# Patient Record
Sex: FEMALE | Race: WHITE | NOT HISPANIC OR LATINO | Employment: FULL TIME | URBAN - METROPOLITAN AREA
[De-identification: names, ages, dates, MRNs, and addresses within clinical notes are randomized per-mention and may not be internally consistent; named-entity substitution may affect disease eponyms.]

---

## 2018-10-17 ENCOUNTER — HISTORICAL (OUTPATIENT)
Dept: INTENSIVE CARE | Facility: HOSPITAL | Age: 43
End: 2018-10-17

## 2022-04-10 ENCOUNTER — HISTORICAL (OUTPATIENT)
Dept: ADMINISTRATIVE | Facility: HOSPITAL | Age: 47
End: 2022-04-10
Payer: COMMERCIAL

## 2022-04-27 VITALS
DIASTOLIC BLOOD PRESSURE: 68 MMHG | WEIGHT: 108 LBS | BODY MASS INDEX: 19.14 KG/M2 | SYSTOLIC BLOOD PRESSURE: 96 MMHG | HEIGHT: 63 IN

## 2022-06-01 DIAGNOSIS — G43.909 MIGRAINE WITHOUT STATUS MIGRAINOSUS, NOT INTRACTABLE, UNSPECIFIED MIGRAINE TYPE: Primary | ICD-10-CM

## 2022-06-01 RX ORDER — ERENUMAB-AOOE 140 MG/ML
140 INJECTION, SOLUTION SUBCUTANEOUS
Qty: 1 ML | Refills: 11 | Status: SHIPPED | OUTPATIENT
Start: 2022-06-01 | End: 2023-04-24 | Stop reason: SDUPTHER

## 2022-06-01 RX ORDER — ERENUMAB-AOOE 140 MG/ML
140 INJECTION, SOLUTION SUBCUTANEOUS
Refills: 11 | COMMUNITY
Start: 2022-04-26 | End: 2022-06-01 | Stop reason: SDUPTHER

## 2022-06-22 RX ORDER — METHYLPHENIDATE HYDROCHLORIDE 36 MG/1
36 TABLET ORAL EVERY MORNING
COMMUNITY
Start: 2022-04-26 | End: 2023-07-18

## 2022-06-22 RX ORDER — PROPRANOLOL HYDROCHLORIDE 20 MG/1
40 TABLET ORAL 2 TIMES DAILY
COMMUNITY
Start: 2022-03-25

## 2022-06-22 RX ORDER — METHYLPHENIDATE HYDROCHLORIDE 54 MG/1
54 TABLET ORAL DAILY
COMMUNITY
Start: 2021-12-09 | End: 2022-06-23

## 2022-06-22 RX ORDER — ERENUMAB-AOOE 140 MG/ML
140 INJECTION, SOLUTION SUBCUTANEOUS
COMMUNITY
Start: 2021-05-18 | End: 2022-06-23

## 2022-06-22 RX ORDER — TRAZODONE HYDROCHLORIDE 50 MG/1
100 TABLET ORAL NIGHTLY PRN
COMMUNITY
Start: 2022-01-21

## 2022-06-22 RX ORDER — LEVOTHYROXINE SODIUM 50 UG/1
100 TABLET ORAL DAILY
COMMUNITY
Start: 2021-12-09 | End: 2023-01-31

## 2022-06-22 RX ORDER — BUPROPION HYDROCHLORIDE 300 MG/1
300 TABLET ORAL DAILY
COMMUNITY
Start: 2022-02-23 | End: 2022-06-23

## 2022-06-22 RX ORDER — CLONIDINE HYDROCHLORIDE 0.1 MG/1
0.1 TABLET ORAL 2 TIMES DAILY
COMMUNITY
Start: 2022-05-26

## 2022-06-22 RX ORDER — MONTELUKAST SODIUM 4 MG/1
2 TABLET, CHEWABLE ORAL 2 TIMES DAILY
COMMUNITY
Start: 2021-12-09 | End: 2022-06-23

## 2022-06-22 RX ORDER — PRASTERONE 6.5 MG/1
1 INSERT VAGINAL
COMMUNITY
Start: 2022-03-22

## 2022-06-22 RX ORDER — OXYCODONE AND ACETAMINOPHEN 10; 325 MG/1; MG/1
1 TABLET ORAL 4 TIMES DAILY PRN
COMMUNITY
Start: 2022-05-26

## 2022-06-22 RX ORDER — LEVETIRACETAM 500 MG/1
2500 TABLET, EXTENDED RELEASE ORAL DAILY
COMMUNITY
Start: 2021-07-09 | End: 2022-06-28

## 2022-06-22 RX ORDER — DOXYCYCLINE HYCLATE 100 MG
100 TABLET ORAL 2 TIMES DAILY
COMMUNITY
Start: 2022-01-07 | End: 2022-06-23

## 2022-06-22 RX ORDER — BUPROPION HYDROCHLORIDE 150 MG/1
150 TABLET ORAL EVERY MORNING
COMMUNITY
Start: 2022-05-27 | End: 2023-07-18

## 2022-06-22 RX ORDER — BUPROPION HYDROCHLORIDE 300 MG/1
300 TABLET ORAL DAILY
COMMUNITY
Start: 2021-12-09 | End: 2022-06-23

## 2022-06-23 ENCOUNTER — OFFICE VISIT (OUTPATIENT)
Dept: NEUROLOGY | Facility: CLINIC | Age: 47
End: 2022-06-23
Payer: COMMERCIAL

## 2022-06-23 VITALS
BODY MASS INDEX: 17.79 KG/M2 | SYSTOLIC BLOOD PRESSURE: 94 MMHG | HEIGHT: 63 IN | WEIGHT: 100.38 LBS | HEART RATE: 68 BPM | DIASTOLIC BLOOD PRESSURE: 70 MMHG

## 2022-06-23 DIAGNOSIS — G40.209 PARTIAL SYMPTOMATIC EPILEPSY WITH COMPLEX PARTIAL SEIZURES, NOT INTRACTABLE, WITHOUT STATUS EPILEPTICUS: ICD-10-CM

## 2022-06-23 DIAGNOSIS — G43.909 MIGRAINE WITHOUT STATUS MIGRAINOSUS, NOT INTRACTABLE, UNSPECIFIED MIGRAINE TYPE: Primary | ICD-10-CM

## 2022-06-23 PROBLEM — R56.9 SEIZURES: Status: ACTIVE | Noted: 2022-06-23

## 2022-06-23 PROBLEM — G40.909 NON-REFRACTORY EPILEPSY: Status: ACTIVE | Noted: 2022-06-23

## 2022-06-23 PROCEDURE — 99214 PR OFFICE/OUTPT VISIT, EST, LEVL IV, 30-39 MIN: ICD-10-PCS | Mod: S$GLB,,, | Performed by: NURSE PRACTITIONER

## 2022-06-23 PROCEDURE — 1160F RVW MEDS BY RX/DR IN RCRD: CPT | Mod: CPTII,S$GLB,, | Performed by: NURSE PRACTITIONER

## 2022-06-23 PROCEDURE — 3078F DIAST BP <80 MM HG: CPT | Mod: CPTII,S$GLB,, | Performed by: NURSE PRACTITIONER

## 2022-06-23 PROCEDURE — 3008F PR BODY MASS INDEX (BMI) DOCUMENTED: ICD-10-PCS | Mod: CPTII,S$GLB,, | Performed by: NURSE PRACTITIONER

## 2022-06-23 PROCEDURE — 1159F MED LIST DOCD IN RCRD: CPT | Mod: CPTII,S$GLB,, | Performed by: NURSE PRACTITIONER

## 2022-06-23 PROCEDURE — 99999 PR PBB SHADOW E&M-EST. PATIENT-LVL III: ICD-10-PCS | Mod: PBBFAC,,, | Performed by: NURSE PRACTITIONER

## 2022-06-23 PROCEDURE — 3078F PR MOST RECENT DIASTOLIC BLOOD PRESSURE < 80 MM HG: ICD-10-PCS | Mod: CPTII,S$GLB,, | Performed by: NURSE PRACTITIONER

## 2022-06-23 PROCEDURE — 99214 OFFICE O/P EST MOD 30 MIN: CPT | Mod: S$GLB,,, | Performed by: NURSE PRACTITIONER

## 2022-06-23 PROCEDURE — 1159F PR MEDICATION LIST DOCUMENTED IN MEDICAL RECORD: ICD-10-PCS | Mod: CPTII,S$GLB,, | Performed by: NURSE PRACTITIONER

## 2022-06-23 PROCEDURE — 1160F PR REVIEW ALL MEDS BY PRESCRIBER/CLIN PHARMACIST DOCUMENTED: ICD-10-PCS | Mod: CPTII,S$GLB,, | Performed by: NURSE PRACTITIONER

## 2022-06-23 PROCEDURE — 3008F BODY MASS INDEX DOCD: CPT | Mod: CPTII,S$GLB,, | Performed by: NURSE PRACTITIONER

## 2022-06-23 PROCEDURE — 99999 PR PBB SHADOW E&M-EST. PATIENT-LVL III: CPT | Mod: PBBFAC,,, | Performed by: NURSE PRACTITIONER

## 2022-06-23 PROCEDURE — 3074F SYST BP LT 130 MM HG: CPT | Mod: CPTII,S$GLB,, | Performed by: NURSE PRACTITIONER

## 2022-06-23 PROCEDURE — 3074F PR MOST RECENT SYSTOLIC BLOOD PRESSURE < 130 MM HG: ICD-10-PCS | Mod: CPTII,S$GLB,, | Performed by: NURSE PRACTITIONER

## 2022-06-23 RX ORDER — LASMIDITAN 50 MG/1
50 TABLET ORAL ONCE AS NEEDED
Qty: 8 TABLET | Refills: 0 | Status: SHIPPED | OUTPATIENT
Start: 2022-06-23 | End: 2022-06-23

## 2022-06-23 RX ORDER — CALCITRIOL 0.25 UG/1
0.25 CAPSULE ORAL DAILY
COMMUNITY
Start: 2022-06-03

## 2022-06-23 NOTE — PROGRESS NOTES
Subjective:       Patient ID: Karyn Clemente is a 47 y.o. female.    Chief Complaint: Seizures (Denies seizures since last visit) and Migraine (Increase of migraines is still having one migraine will last a few days most recently lasted three days rescue medication was unable to break migraine )      History of Present Illness:  Follow-up visit for seizures and migraine.     Patient reports migraines have overall been well controlled.  She is on Aimovig and propanolol.  She says she is still getting anywhere from 1-3 migraines per month, but they are much less severe and typically not lasting very long.  She does note that she had a very severe migraine this month where she was basically in bed for 3 days.  She tried rescue with Imitrex as well as rescue with the migraine cocktail, but did not have any relief.  In the past, she was taking Nurtec for rescue and it was helpful for her according to her notes.  She is not sure why she no longer has that medication.    Patient tells me she has not had any overt seizure activity since last visit.  She says she has recently been noticing that her kids will ask her if she is paying attention, but she does not have any lapses in time like she had before.  She does note that she is currently under a lot of stress.  She is now back on Wellbutrin and Concerta.  She says these were both restarted within the last year.  After restarting the meds, as she has been suffering with unintentional weight loss.  Her endocrinologist has tried altering or thyroid medication, but she has still been symptomatic.  She tells me that they are considering looking into her pituitary gland as a possible contributing factor.  Dr. Sultana halved her Wellbutrin and Concerta doses about 4 months ago in an effort to help with the weight loss.  She tells me she has not noticed any weight benefit since that time.  She says the Wellbutrin really isn't controlling her depression very well, but she does  "note significant improvement with energy and concentration on Concerta.      Review of Systems  See HPI above        Outpatient Encounter Medications as of 6/23/2022   Medication Sig Dispense Refill    AIMOVIG AUTOINJECTOR 140 mg/mL autoinjector Inject 1 mL (140 mg total) into the skin every 30 days. I mL (140 mg), qMonth 1 mL 11    buPROPion (WELLBUTRIN XL) 150 MG TB24 tablet Take 150 mg by mouth every morning.      calcitRIOL (ROCALTROL) 0.25 MCG Cap Take 0.25 mcg by mouth once daily.      cloNIDine (CATAPRES) 0.1 MG tablet Take 0.1 mg by mouth 2 (two) times daily.      INTRAROSA 6.5 mg Inst Place 1 application vaginally as needed.      levetiracetam XR (KEPPRA XR) 500 mg Tb24 24 hr tablet Take 2,500 mg by mouth Daily.      levothyroxine (SYNTHROID) 50 MCG tablet Take 100 mcg by mouth Daily.      methylphenidate HCl 36 MG CR tablet Take 36 mg by mouth every morning.      oxyCODONE-acetaminophen (PERCOCET)  mg per tablet Take 1 tablet by mouth 4 (four) times daily as needed.      propranoloL (INDERAL) 20 MG tablet Take 40 mg by mouth 2 (two) times daily.      traZODone (DESYREL) 50 MG tablet Take 100 mg by mouth nightly as needed.      [DISCONTINUED] buPROPion (WELLBUTRIN XL) 300 MG 24 hr tablet Take 300 mg by mouth once daily.      [DISCONTINUED] buPROPion (WELLBUTRIN XL) 300 MG 24 hr tablet Take 300 mg by mouth Daily.      [DISCONTINUED] colestipoL (COLESTID) 1 gram Tab Take 2 g by mouth 2 (two) times a day.      [DISCONTINUED] doxycycline (VIBRA-TABS) 100 MG tablet Take 100 mg by mouth 2 (two) times daily.      [DISCONTINUED] erenumab-aooe (AIMOVIG AUTOINJECTOR) 140 mg/mL autoinjector Inject 140 mg into the skin every 30 days.      [DISCONTINUED] methylphenidate HCl 54 MG CR tablet Take 54 mg by mouth Daily.       No facility-administered encounter medications on file as of 6/23/2022.      Objective:   BP 94/70   Pulse 68   Ht 5' 2.99" (1.6 m)   Wt 45.5 kg (100 lb 6.4 oz)   BMI 17.79 " kg/m²          Physical Exam  Vitals and nursing note reviewed.   Constitutional:       Appearance: Normal appearance. She is normal weight.   HENT:      Head: Normocephalic.   Pulmonary:      Effort: Pulmonary effort is normal.   Neurological:      General: No focal deficit present.      Mental Status: She is alert and oriented to person, place, and time.      Cranial Nerves: No dysarthria or facial asymmetry.      Motor: Motor function is intact.      Gait: Gait is intact.   Psychiatric:         Mood and Affect: Mood normal.           Assessment & Plan:      1. Migraine without status migrainosus, not intractable, unspecified migraine type  Overview:  She has been on several AEDs for seizures.  She has also been on Lyrica, cymbalta, propranolol, trazadone, and topiramate in the past.  She was started on Aimovig in 2018 and migraines have been well controlled ever since.     Prior abortive include PO and SQ Imitrex, cambia, migraine cocktail, sprix    Assessment & Plan:  Overall, doing well with aimovig and propranolol as preventives.  Just needs better rescue for more severe migraines.  Resume nurtec prn.  Instructed to take nurtec 1st for rescue.  She can take imitrex or migraine cocktail if headache not relieved by nurtec.  If still no relief, can try Reyvow 4 hours later.  Instructed to not drive for at least 8 hours after Reyvow dosing.      2. Partial symptomatic epilepsy with complex partial seizures, not intractable, without status epilepticus  Overview:  This is a patient who was established with Dr. Monahan/Shagufta in 2016.  Her care was transferred to Dr. Taylor in October 2017. Her typical seizures historically consisted of a headache or sharp pain on the left side, photophobia, and phonophobia.  She could hear things but cannot respond and she would have lapses in time.  In the past, she has been on Zonegran, topiramate (memory issues), IR Keppra, XR Keppra.  She is now maintained on keppra XR 2500 mg  daily    Assessment & Plan:  -Continue keppra XR 2500 mg daily  -We did discuss the risk of Wellbutrin in seizures.  Her dose was decreased due to weight loss and she is not really seeing much benefit with the current dose so I am wondering if maybe she would see more benefit with something like Effexor which would not pose the same risk.  She will discuss this with Dr. Sultana at her next visit in August.          This note was created with the assistance of voice recognition software. There may be transcription errors as a result of using this technology however minimal. Effort has been made to assure accuracy of transcription but any obvious errors or omissions should be clarified with the author of the document.

## 2022-06-23 NOTE — ASSESSMENT & PLAN NOTE
-Continue keppra XR 2500 mg daily  -We did discuss the risk of Wellbutrin in seizures.  Her dose was decreased due to weight loss and she is not really seeing much benefit with the current dose so I am wondering if maybe she would see more benefit with something like Effexor which would not pose the same risk.  She will discuss this with Dr. Sultana at her next visit in August.

## 2022-06-23 NOTE — ASSESSMENT & PLAN NOTE
Overall, doing well with aimovig and propranolol as preventives.  Just needs better rescue for more severe migraines.  Resume nurtec prn.  Instructed to take nurtec 1st for rescue.  She can take imitrex or migraine cocktail if headache not relieved by nurtec.  If still no relief, can try Reyvow 4 hours later.  Instructed to not drive for at least 8 hours after Reyvow dosing.

## 2023-01-31 ENCOUNTER — OFFICE VISIT (OUTPATIENT)
Dept: NEUROLOGY | Facility: CLINIC | Age: 48
End: 2023-01-31
Payer: COMMERCIAL

## 2023-01-31 VITALS
SYSTOLIC BLOOD PRESSURE: 106 MMHG | HEART RATE: 60 BPM | BODY MASS INDEX: 18.78 KG/M2 | HEIGHT: 63 IN | DIASTOLIC BLOOD PRESSURE: 72 MMHG | WEIGHT: 106 LBS

## 2023-01-31 DIAGNOSIS — R47.89 WORD FINDING DIFFICULTY: Primary | ICD-10-CM

## 2023-01-31 DIAGNOSIS — G43.709 CHRONIC MIGRAINE W/O AURA W/O STATUS MIGRAINOSUS, NOT INTRACTABLE: ICD-10-CM

## 2023-01-31 DIAGNOSIS — M54.9 DORSALGIA, UNSPECIFIED: ICD-10-CM

## 2023-01-31 DIAGNOSIS — M48.02 SPINAL STENOSIS, CERVICAL REGION: ICD-10-CM

## 2023-01-31 PROCEDURE — 99215 PR OFFICE/OUTPT VISIT, EST, LEVL V, 40-54 MIN: ICD-10-PCS | Mod: S$GLB,,, | Performed by: PSYCHIATRY & NEUROLOGY

## 2023-01-31 PROCEDURE — 1159F MED LIST DOCD IN RCRD: CPT | Mod: CPTII,S$GLB,, | Performed by: PSYCHIATRY & NEUROLOGY

## 2023-01-31 PROCEDURE — 3074F SYST BP LT 130 MM HG: CPT | Mod: CPTII,S$GLB,, | Performed by: PSYCHIATRY & NEUROLOGY

## 2023-01-31 PROCEDURE — 3008F PR BODY MASS INDEX (BMI) DOCUMENTED: ICD-10-PCS | Mod: CPTII,S$GLB,, | Performed by: PSYCHIATRY & NEUROLOGY

## 2023-01-31 PROCEDURE — 3074F PR MOST RECENT SYSTOLIC BLOOD PRESSURE < 130 MM HG: ICD-10-PCS | Mod: CPTII,S$GLB,, | Performed by: PSYCHIATRY & NEUROLOGY

## 2023-01-31 PROCEDURE — 1159F PR MEDICATION LIST DOCUMENTED IN MEDICAL RECORD: ICD-10-PCS | Mod: CPTII,S$GLB,, | Performed by: PSYCHIATRY & NEUROLOGY

## 2023-01-31 PROCEDURE — 1160F PR REVIEW ALL MEDS BY PRESCRIBER/CLIN PHARMACIST DOCUMENTED: ICD-10-PCS | Mod: CPTII,S$GLB,, | Performed by: PSYCHIATRY & NEUROLOGY

## 2023-01-31 PROCEDURE — 3078F PR MOST RECENT DIASTOLIC BLOOD PRESSURE < 80 MM HG: ICD-10-PCS | Mod: CPTII,S$GLB,, | Performed by: PSYCHIATRY & NEUROLOGY

## 2023-01-31 PROCEDURE — 3078F DIAST BP <80 MM HG: CPT | Mod: CPTII,S$GLB,, | Performed by: PSYCHIATRY & NEUROLOGY

## 2023-01-31 PROCEDURE — 99215 OFFICE O/P EST HI 40 MIN: CPT | Mod: S$GLB,,, | Performed by: PSYCHIATRY & NEUROLOGY

## 2023-01-31 PROCEDURE — 1160F RVW MEDS BY RX/DR IN RCRD: CPT | Mod: CPTII,S$GLB,, | Performed by: PSYCHIATRY & NEUROLOGY

## 2023-01-31 PROCEDURE — 99999 PR PBB SHADOW E&M-EST. PATIENT-LVL IV: ICD-10-PCS | Mod: PBBFAC,,, | Performed by: PSYCHIATRY & NEUROLOGY

## 2023-01-31 PROCEDURE — 3008F BODY MASS INDEX DOCD: CPT | Mod: CPTII,S$GLB,, | Performed by: PSYCHIATRY & NEUROLOGY

## 2023-01-31 PROCEDURE — 99999 PR PBB SHADOW E&M-EST. PATIENT-LVL IV: CPT | Mod: PBBFAC,,, | Performed by: PSYCHIATRY & NEUROLOGY

## 2023-01-31 RX ORDER — LEVOTHYROXINE SODIUM 100 UG/1
100 TABLET ORAL DAILY
COMMUNITY
Start: 2023-01-20

## 2023-01-31 NOTE — PROGRESS NOTES
Chief Complaint   Patient presents with    Migraine     Pt states migraine intensity has decreased since having 1-2 a week lasting up to two days currently Aimovig along with propranolol; sumatriptan and rayvow for rescue unable to get approval for nurtec     Seizures     Denies seizure activity since last visit currently Keppra 2500 mg daily        This is a 47 y.o. female here for follow up for seizures and migraine.  Patient reports migraines have overall been well controlled.  She is on Aimovig and propranolol.  She does not like the way sumatriptan makes her feel it makes her feel tingly and with pins and needles.  She does at sometimes have to take rare bowel for more severe headaches.  She is currently getting anywhere from 1-3 migraines per month but they are less severe.  She continues to have intermittent severe migraines which put her in bed for a few days.  For these she has tried migraine cocktail which sometimes worse but sometimes she does not have relief.  She is unable to get Nurtec due to insurance reasons.  She denies any seizures.  Is taking Keppra 2500 mg daily.  Recall this was increased after patient reported having spells of zoning out prior to the December 2021 visit.  Recall she is a previous patient of Dr. Bernstein.  Previously he had done an EMG on her due to right-sided symptoms and was found to have a right L5 radiculopathy.  She has had previous MRIs with Dr. Yuriy Amaya.  She continues to complain of weakness now in her left hand as well.  Feels like she is losing function in the hand.  Has also noticed some tingling in that hand.  Continues to have right leg weakness.  Also is complaining of word-finding difficulty.  This has been going on for some time, she thinks prior to any med changes (recall her Ritalin as well as her Wellbutrin was decreased due to weight loss).  Makes it difficult for her to have a conversation.    Medication List with Changes/Refills   New Medications     UBROGEPANT (UBRELVY) 100 MG TABLET    Take 1 tablet (100 mg total) by mouth once as needed for Migraine.   Current Medications    AIMOVIG AUTOINJECTOR 140 MG/ML AUTOINJECTOR    Inject 1 mL (140 mg total) into the skin every 30 days. I mL (140 mg), qMonth    BUPROPION (WELLBUTRIN XL) 150 MG TB24 TABLET    Take 150 mg by mouth every morning.    CALCITRIOL (ROCALTROL) 0.25 MCG CAP    Take 0.25 mcg by mouth once daily.    CLONIDINE (CATAPRES) 0.1 MG TABLET    Take 0.1 mg by mouth 2 (two) times daily.    INTRAROSA 6.5 MG INST    Place 1 application vaginally as needed.    LEVETIRACETAM XR (KEPPRA XR) 500 MG TB24 24 HR TABLET    TAKE FIVE TABLETS BY MOUTH EVERY DAY    LEVOTHYROXINE (SYNTHROID) 100 MCG TABLET    Take 100 mcg by mouth once daily.    METHYLPHENIDATE HCL 36 MG CR TABLET    Take 36 mg by mouth every morning.    OXYCODONE-ACETAMINOPHEN (PERCOCET)  MG PER TABLET    Take 1 tablet by mouth 4 (four) times daily as needed.    PROPRANOLOL (INDERAL) 20 MG TABLET    Take 40 mg by mouth 2 (two) times daily.    SUMATRIPTAN (IMITREX) 50 MG TABLET    TAKE 1 TABLET ONCE DAILY AS NEEDED FOR MIGRAINE--MAY REPEAT DOSE ONCE IN 2 HOURS    TRAZODONE (DESYREL) 50 MG TABLET    Take 100 mg by mouth nightly as needed.   Discontinued Medications    LEVOTHYROXINE (SYNTHROID) 50 MCG TABLET    Take 100 mcg by mouth Daily.        Vitals:    01/31/23 1059   BP: 106/72   Pulse: 60        General: alert and oriented, no acute distress, no audible wheezes, pulse intact, no edema    Cognition and Comprehension  Speech and language intact  Follows commands  Speech fluent  Attention intact  Memory for recent events intact from history taking  Affect pleasant  Fund of knowledge adequate    Cranial nerves  II. Optic: Visual fields full to confrontation both eyes  III, IV, VI. Oculomotor: Intact, Pupils equal, round and reactive to light, no nystagmus  V. Trigeminal: sensation to light touch normal  VII. No facial asymmetry or facial  weakness  VIII. Hearing intact to spoken voice  IX/X. Glossopharyngeal/Vagus: Voice normal, palate rises symmetrically  XI. Axillary: Shoulder shrug normal  XII. Hypoglossal: Intact    Muscle Strength and Tone  Normal upper extremity tone  Normal lower extremity tone  Normal upper extremity strength  Normal lower extremity strength  Give way weakness throughout    Sensation  Intact to light touch and temperature    Reflexes  Normal and symmetric    Coordination and Gait  Finger to nose normal  Gait normal     1. Word finding difficulty  -     Vitamin B12; Future; Expected date: 01/31/2023  -     MRI Brain Without Contrast; Future; Expected date: 01/31/2023    2. Dorsalgia, unspecified  -     MRI Lumbar Spine Without Contrast; Future; Expected date: 01/31/2023    3. Spinal stenosis, cervical region  -     MRI Cervical Spine Without Contrast; Future; Expected date: 01/31/2023    4. Chronic migraine w/o aura w/o status migrainosus, not intractable  -     ubrogepant (UBRELVY) 100 mg tablet; Take 1 tablet (100 mg total) by mouth once as needed for Migraine.  Dispense: 16 tablet; Refill: 5

## 2023-02-06 ENCOUNTER — TELEPHONE (OUTPATIENT)
Dept: NEUROLOGY | Facility: CLINIC | Age: 48
End: 2023-02-06
Payer: COMMERCIAL

## 2023-02-06 NOTE — TELEPHONE ENCOUNTER
Patient called requesting a status update on her disability paperwork that she left at the office. States the paperwork needs to be submitted by Friday, February 10, 2023. Requesting a call back to let her know when it is completed and she will come pick it up. Please advise.

## 2023-03-07 ENCOUNTER — TELEPHONE (OUTPATIENT)
Dept: NEUROLOGY | Facility: CLINIC | Age: 48
End: 2023-03-07

## 2023-03-07 NOTE — TELEPHONE ENCOUNTER
Pt informed of MRIs are overall unremarkable. 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 If pain is present within the neck and back therapy or injections would be an option. MRI brain shows findings seen with headaches and normal for her age.

## 2023-03-07 NOTE — TELEPHONE ENCOUNTER
----- Message from Grazyna Harding MD sent at 3/6/2023  4:34 PM CST -----  Please let patient know MRIs are overall unremarkable. She does have arthritis in neck and back but nothing that would warrant surgery or surgical consults. If having pain in neck or back, therapy or injections could be pursued. MRI brain shows findings we would see with headaches and normal for her age.

## 2023-04-24 ENCOUNTER — TELEPHONE (OUTPATIENT)
Dept: NEUROLOGY | Facility: CLINIC | Age: 48
End: 2023-04-24
Payer: COMMERCIAL

## 2023-04-24 DIAGNOSIS — G43.909 MIGRAINE WITHOUT STATUS MIGRAINOSUS, NOT INTRACTABLE, UNSPECIFIED MIGRAINE TYPE: ICD-10-CM

## 2023-04-24 RX ORDER — ERENUMAB-AOOE 140 MG/ML
140 INJECTION, SOLUTION SUBCUTANEOUS
Qty: 1 ML | Refills: 11 | Status: SHIPPED | OUTPATIENT
Start: 2023-04-24 | End: 2023-06-19

## 2023-04-24 NOTE — TELEPHONE ENCOUNTER
Reports [facility] pharmacy notified her this medication is not being covered by her insurance, but she has received  a message from the clinic notifying her this has been approved.   States seeking advice.    Medication: Aimovig 140 mg/ mL      Last Appointment: 1/31/23    Next Appointment: 7/18/23

## 2023-04-25 NOTE — TELEPHONE ENCOUNTER
Spoke with pharmacy informed of current approval instructed to correct day supply for 28 days also faxed PA approval to pharmacy informed Pt as well and to call office if any difficulties receiving medication

## 2023-06-16 DIAGNOSIS — G43.909 MIGRAINE WITHOUT STATUS MIGRAINOSUS, NOT INTRACTABLE, UNSPECIFIED MIGRAINE TYPE: ICD-10-CM

## 2023-06-19 RX ORDER — ERENUMAB-AOOE 140 MG/ML
INJECTION, SOLUTION SUBCUTANEOUS
Qty: 1 ML | Refills: 11 | Status: SHIPPED | OUTPATIENT
Start: 2023-06-19 | End: 2023-07-18 | Stop reason: SDUPTHER

## 2023-07-18 ENCOUNTER — OFFICE VISIT (OUTPATIENT)
Dept: NEUROLOGY | Facility: CLINIC | Age: 48
End: 2023-07-18
Payer: COMMERCIAL

## 2023-07-18 VITALS
BODY MASS INDEX: 24.99 KG/M2 | DIASTOLIC BLOOD PRESSURE: 72 MMHG | SYSTOLIC BLOOD PRESSURE: 110 MMHG | HEART RATE: 74 BPM | WEIGHT: 108 LBS | HEIGHT: 55 IN

## 2023-07-18 DIAGNOSIS — G40.209 PARTIAL SYMPTOMATIC EPILEPSY WITH COMPLEX PARTIAL SEIZURES, NOT INTRACTABLE, WITHOUT STATUS EPILEPTICUS: Primary | ICD-10-CM

## 2023-07-18 DIAGNOSIS — G43.909 MIGRAINE WITHOUT STATUS MIGRAINOSUS, NOT INTRACTABLE, UNSPECIFIED MIGRAINE TYPE: ICD-10-CM

## 2023-07-18 DIAGNOSIS — H93.A3 PULSATILE TINNITUS OF BOTH EARS: ICD-10-CM

## 2023-07-18 DIAGNOSIS — Q79.62 EHLERS-DANLOS, HYPERMOBILE TYPE: ICD-10-CM

## 2023-07-18 DIAGNOSIS — H93.A9 PULSATILE TINNITUS, UNSPECIFIED EAR: ICD-10-CM

## 2023-07-18 DIAGNOSIS — R29.898 LEFT HAND WEAKNESS: ICD-10-CM

## 2023-07-18 PROCEDURE — 3074F PR MOST RECENT SYSTOLIC BLOOD PRESSURE < 130 MM HG: ICD-10-PCS | Mod: CPTII,S$GLB,, | Performed by: NURSE PRACTITIONER

## 2023-07-18 PROCEDURE — 99999 PR PBB SHADOW E&M-EST. PATIENT-LVL III: ICD-10-PCS | Mod: PBBFAC,,, | Performed by: NURSE PRACTITIONER

## 2023-07-18 PROCEDURE — 3008F BODY MASS INDEX DOCD: CPT | Mod: CPTII,S$GLB,, | Performed by: NURSE PRACTITIONER

## 2023-07-18 PROCEDURE — 3008F PR BODY MASS INDEX (BMI) DOCUMENTED: ICD-10-PCS | Mod: CPTII,S$GLB,, | Performed by: NURSE PRACTITIONER

## 2023-07-18 PROCEDURE — 99215 OFFICE O/P EST HI 40 MIN: CPT | Mod: S$GLB,,, | Performed by: NURSE PRACTITIONER

## 2023-07-18 PROCEDURE — 1159F MED LIST DOCD IN RCRD: CPT | Mod: CPTII,S$GLB,, | Performed by: NURSE PRACTITIONER

## 2023-07-18 PROCEDURE — 99215 PR OFFICE/OUTPT VISIT, EST, LEVL V, 40-54 MIN: ICD-10-PCS | Mod: S$GLB,,, | Performed by: NURSE PRACTITIONER

## 2023-07-18 PROCEDURE — 3074F SYST BP LT 130 MM HG: CPT | Mod: CPTII,S$GLB,, | Performed by: NURSE PRACTITIONER

## 2023-07-18 PROCEDURE — 3078F PR MOST RECENT DIASTOLIC BLOOD PRESSURE < 80 MM HG: ICD-10-PCS | Mod: CPTII,S$GLB,, | Performed by: NURSE PRACTITIONER

## 2023-07-18 PROCEDURE — 1159F PR MEDICATION LIST DOCUMENTED IN MEDICAL RECORD: ICD-10-PCS | Mod: CPTII,S$GLB,, | Performed by: NURSE PRACTITIONER

## 2023-07-18 PROCEDURE — 99999 PR PBB SHADOW E&M-EST. PATIENT-LVL III: CPT | Mod: PBBFAC,,, | Performed by: NURSE PRACTITIONER

## 2023-07-18 PROCEDURE — 3078F DIAST BP <80 MM HG: CPT | Mod: CPTII,S$GLB,, | Performed by: NURSE PRACTITIONER

## 2023-07-18 RX ORDER — LEVETIRACETAM 500 MG/1
TABLET, EXTENDED RELEASE ORAL
Qty: 450 TABLET | Refills: 1 | Status: SHIPPED | OUTPATIENT
Start: 2023-07-18 | End: 2024-01-19

## 2023-07-18 RX ORDER — UBROGEPANT 100 MG/1
100 TABLET ORAL DAILY PRN
COMMUNITY
Start: 2023-07-05 | End: 2023-07-18 | Stop reason: SDUPTHER

## 2023-07-18 RX ORDER — UBROGEPANT 100 MG/1
100 TABLET ORAL DAILY PRN
Qty: 16 TABLET | Refills: 2 | Status: SHIPPED | OUTPATIENT
Start: 2023-07-18

## 2023-07-18 RX ORDER — LEVOTHYROXINE SODIUM 112 UG/1
112 TABLET ORAL DAILY
COMMUNITY
Start: 2023-06-19

## 2023-07-18 RX ORDER — AMPHETAMINE 9.4 MG/1
9.4 TABLET, ORALLY DISINTEGRATING ORAL DAILY
COMMUNITY

## 2023-07-18 RX ORDER — ERENUMAB-AOOE 140 MG/ML
INJECTION, SOLUTION SUBCUTANEOUS
Qty: 1 ML | Refills: 11 | Status: SHIPPED | OUTPATIENT
Start: 2023-07-18

## 2023-07-18 NOTE — ASSESSMENT & PLAN NOTE
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  Eval with MRA head and neck to check for dissection/aneurysm  -EKG and TTE given concerns of chest heaviness and globus sensation.  Has also discussed with PCP who ordered Chest XR

## 2023-08-23 ENCOUNTER — TELEPHONE (OUTPATIENT)
Dept: NEUROLOGY | Facility: CLINIC | Age: 48
End: 2023-08-23

## 2023-08-23 NOTE — TELEPHONE ENCOUNTER
Pt informed MRA normal also requesting at last OV possibly discussed order for wrist brace please advise

## 2024-01-19 DIAGNOSIS — G40.209 PARTIAL SYMPTOMATIC EPILEPSY WITH COMPLEX PARTIAL SEIZURES, NOT INTRACTABLE, WITHOUT STATUS EPILEPTICUS: ICD-10-CM

## 2024-01-19 RX ORDER — LEVETIRACETAM 500 MG/1
TABLET, EXTENDED RELEASE ORAL
Qty: 450 TABLET | Refills: 3 | Status: SHIPPED | OUTPATIENT
Start: 2024-01-19

## 2024-01-22 ENCOUNTER — OFFICE VISIT (OUTPATIENT)
Dept: NEUROLOGY | Facility: CLINIC | Age: 49
End: 2024-01-22
Payer: COMMERCIAL

## 2024-01-22 VITALS
WEIGHT: 108 LBS | HEART RATE: 68 BPM | DIASTOLIC BLOOD PRESSURE: 88 MMHG | BODY MASS INDEX: 24.99 KG/M2 | SYSTOLIC BLOOD PRESSURE: 110 MMHG | HEIGHT: 55 IN

## 2024-01-22 DIAGNOSIS — G43.909 MIGRAINE WITHOUT STATUS MIGRAINOSUS, NOT INTRACTABLE, UNSPECIFIED MIGRAINE TYPE: ICD-10-CM

## 2024-01-22 DIAGNOSIS — G40.209 NONINTRACTABLE EPILEPSY WITH COMPLEX PARTIAL SEIZURES: Primary | ICD-10-CM

## 2024-01-22 DIAGNOSIS — H93.A3 PULSATILE TINNITUS OF BOTH EARS: ICD-10-CM

## 2024-01-22 DIAGNOSIS — R29.898 LEFT HAND WEAKNESS: ICD-10-CM

## 2024-01-22 PROCEDURE — 3079F DIAST BP 80-89 MM HG: CPT | Mod: CPTII,S$GLB,, | Performed by: NURSE PRACTITIONER

## 2024-01-22 PROCEDURE — 99214 OFFICE O/P EST MOD 30 MIN: CPT | Mod: S$GLB,,, | Performed by: NURSE PRACTITIONER

## 2024-01-22 PROCEDURE — 3008F BODY MASS INDEX DOCD: CPT | Mod: CPTII,S$GLB,, | Performed by: NURSE PRACTITIONER

## 2024-01-22 PROCEDURE — 99999 PR PBB SHADOW E&M-EST. PATIENT-LVL III: CPT | Mod: PBBFAC,,, | Performed by: NURSE PRACTITIONER

## 2024-01-22 PROCEDURE — 3074F SYST BP LT 130 MM HG: CPT | Mod: CPTII,S$GLB,, | Performed by: NURSE PRACTITIONER

## 2024-01-22 PROCEDURE — 1159F MED LIST DOCD IN RCRD: CPT | Mod: CPTII,S$GLB,, | Performed by: NURSE PRACTITIONER

## 2024-01-22 RX ORDER — DEXTROAMPHETAMINE SACCHARATE, AMPHETAMINE ASPARTATE, DEXTROAMPHETAMINE SULFATE AND AMPHETAMINE SULFATE 3.125; 3.125; 3.125; 3.125 MG/1; MG/1; MG/1; MG/1
12.5 TABLET ORAL DAILY
COMMUNITY

## 2024-01-22 NOTE — PROGRESS NOTES
Subjective:       Patient ID: Karyn Clemente is a 48 y.o. female.    Chief Complaint: Partial symptomatic epilepsy with complex partial seizures (Denies any seizure activities. Tolerated Keppra  mg 5 tabs daily.), Migraine (Migraines are much better with the aimovig. Gets at least 2 headaches a week. Ubrelvy does help preventing it from becoming a full blown migraine. Certain smells will trigger a headaches. ), Pulsatile tinnitus of both ear (Still hears a whooshing sound in ears. Sometimes maybe several times a day/week. ), and Left hand weakness (Weakness to left hand is still bad. It has gotten to a point where she cannot hold or open things. Dr. Sultana had mentioned her to have a NCS, but was told they do not do it here anymore. So and MRI neck and brain was done. )      History of Present Illness:  Follow-up visit for migraines and seizures.  She is still on Aimovig and propranolol and overall feels she is doing well.  Migraine frequency and severity is still under control.  She does get 1-2 migraines a week, but they are far less severe.  Ubrelvy prevents them from becoming true migraines.    No seizures since last visit.  Still taking Keppra XR 2500 mg daily with no side effects.      She has noticed worsening of left hand pain and weakness.  She localizes the pain to the median distribution on the left especially in the palmar region radiating up into her thumb.  She is having a lot of difficulty with opening things and holding things up.  She is ready to move forward with nerve conduction study.      Recall, she was diagnosed clinically with hyper mobile EDS prior to her last visit.  She was complaining of pulsatile tinnitus and dizziness so an MRA head and neck was ordered which was normal.  She did not have EKG or echo that was ordered at last visit.        Review of Systems  I have reviewed a 12 point review of systems which is negative unless otherwise stated in HPI        Past Medical History:    Diagnosis Date    Bilateral leg pain     Fibromyalgia     Headache     Lower back pain     Seizures        Past Surgical History:   Procedure Laterality Date    BREAST BIOPSY      CHOLECYSTECTOMY      COLONOSCOPY      HYSTERECTOMY      THYROIDECTOMY          Family History   Problem Relation Age of Onset    Bipolar disorder Mother     Seizures Mother     Hepatitis Mother     Hepatitis Father         Social History     Socioeconomic History    Marital status:    Tobacco Use    Smoking status: Former     Types: Cigarettes    Smokeless tobacco: Never   Substance and Sexual Activity    Alcohol use: Not Currently    Drug use: Yes     Types: Oxycodone        Outpatient Encounter Medications as of 1/22/2024   Medication Sig Dispense Refill    calcitRIOL (ROCALTROL) 0.25 MCG Cap Take 0.25 mcg by mouth once daily.      cloNIDine (CATAPRES) 0.1 MG tablet Take 0.1 mg by mouth 2 (two) times daily.      dextroamphetamine-amphetamine (ADDERALL) 12.5 MG tablet Take 12.5 mg by mouth once daily.      erenumab-aooe (AIMOVIG AUTOINJECTOR) 140 mg/mL autoinjector INJECT 1 ML IN THE MUSCLE FOR 30 DAYS 1 mL 11    levetiracetam XR (KEPPRA XR) 500 mg Tb24 24 hr tablet TAKE FIVE TABLETS BY MOUTH EVERY  tablet 3    levothyroxine (SYNTHROID) 100 MCG tablet Take 100 mcg by mouth once daily.      oxyCODONE-acetaminophen (PERCOCET)  mg per tablet Take 1 tablet by mouth 4 (four) times daily as needed.      propranoloL (INDERAL) 20 MG tablet Take 40 mg by mouth 2 (two) times daily.      sumatriptan (IMITREX) 50 MG tablet TAKE 1 TABLET ONCE DAILY AS NEEDED FOR MIGRAINE--MAY REPEAT DOSE ONCE IN 2 HOURS 9 tablet 2    traZODone (DESYREL) 50 MG tablet Take 100 mg by mouth nightly as needed.      UBRELVY 100 mg tablet Take 1 tablet (100 mg total) by mouth daily as needed for Migraine. 16 tablet 2    amphetamine (ADZENYS XR-ODT) 9.4 mg TbLB Take 9.4 mg by mouth once daily.      INTRAROSA 6.5 mg Inst Place 1 application vaginally as  "needed.      levothyroxine (SYNTHROID) 112 MCG tablet Take 112 mcg by mouth once daily.      [DISCONTINUED] levetiracetam XR (KEPPRA XR) 500 mg Tb24 24 hr tablet TAKE FIVE TABLETS BY MOUTH EVERY  tablet 1     No facility-administered encounter medications on file as of 1/22/2024.      Objective:   /88 (BP Location: Right arm)   Pulse 68   Ht 4' 3" (1.295 m)   Wt 49 kg (108 lb)   BMI 29.19 kg/m²        Physical Exam  General:  Alert and oriented  NAD  No overt edema    Cognition and Comprehension:  Speech and language intact  Follows commands    Cranial nerves:   CN 2_ Visual fields (full to confrontation both eyes)  CN 3, 4, 6_ Intact, BRANDIE, no nystagmus  CN 7_no face asymmetry; normal eye closure and smile  CN 8_hearing intact to spoken voice  CN 9, 10, 11_voice normal, shoulder shrug ok; deltoids not fatigable     Muscle Strength and Tone:  Normal upper extremity tone  Normal lower extremity tone  Normal upper extremity strength  Normal lower extremity strength  FDI and APB weakness L>R    Reflexes:  Normal and symmetric    Coordination and Gait:  Coordination and gait are normal   No ataxia with FTN      Assessment & Plan:      1. Nonintractable epilepsy with complex partial seizures  Overview:  This is a patient who was established with Dr. Monahan/Shagufta in 2016.  Her care was transferred to Dr. Harding in October 2017. Her typical seizures historically consisted of a headache or sharp pain on the left side, photophobia, and phonophobia.  She could hear things but cannot respond and she would have lapses in time.  In the past, she has been on Zonegran, topiramate (memory issues), IR Keppra, XR Keppra.  She is now maintained on keppra XR 2500 mg daily    Assessment & Plan:  Continue Keppra XR 2500 mg daily      2. Migraine without status migrainosus, not intractable, unspecified migraine type  Overview:  She has been on several AEDs for seizures.  She has also been on Lyrica, cymbalta, " propranolol, trazadone, and topiramate in the past.  She was started on Aimovig in 2018 and migraines have been well controlled ever since.     Prior abortive include PO and SQ Imitrex, cambia, migraine cocktail, sprix    Assessment & Plan:  -continue Aimovig for prophylaxis and Ubrelvy for rescue      3. Pulsatile tinnitus of both ears  Overview:  MRA head and neck 2023 normal      4. Left hand weakness  Overview:  MRI C-spine showed mild multilevel cervical degenerative changes with no significant spinal canal stenosis and with moderate bilateral neuroforaminal stenosis at C5-6    Assessment & Plan:  -worsening weakness so order for NCS/EMG to eval for CTS.  Referral to ortho once EMG completed    Orders:  -     EMG W/ NERVE CONDUCTION 1 Extremity; Future          This note was created with the assistance of voice recognition software. There may be transcription errors as a result of using this technology however minimal. Effort has been made to assure accuracy of transcription but any obvious errors or omissions should be clarified with the author of the document.

## 2024-03-26 ENCOUNTER — TELEPHONE (OUTPATIENT)
Dept: NEUROLOGY | Facility: CLINIC | Age: 49
End: 2024-03-26

## 2024-03-26 NOTE — TELEPHONE ENCOUNTER
Results given to patient. States would like to come in to discuss her options. Dr. Oliva recommends she go to a Rheumatologist for injections and between her cardiac workup she would like to come in to discuss this. I did inform her you recommend a referral to ortho. But prefers she speak to you first.

## 2024-03-26 NOTE — TELEPHONE ENCOUNTER
----- Message from LALI Soares sent at 3/26/2024  1:15 PM CDT -----  NCS study was normal.  Does she want to proceed with referral to orthopedics?

## 2024-04-16 ENCOUNTER — TELEPHONE (OUTPATIENT)
Dept: NEUROLOGY | Facility: CLINIC | Age: 49
End: 2024-04-16
Payer: COMMERCIAL

## 2024-04-16 DIAGNOSIS — R07.9 CHEST PAIN, UNSPECIFIED TYPE: Primary | ICD-10-CM

## 2024-04-16 DIAGNOSIS — R94.31 ABNORMAL EKG: ICD-10-CM

## 2024-04-16 NOTE — TELEPHONE ENCOUNTER
----- Message from LALI Soares sent at 4/16/2024 11:13 AM CDT -----  EKG was abnormal, but ultrasound of heart was normal.   Is she still experiencing periodic chest tightness and heaviness?  If she is, we can send a referral to cardiologist of her choice

## 2024-04-16 NOTE — TELEPHONE ENCOUNTER
Results given to patient. States at times she does feel some heaviness in her chest. Whom ever you refer her too it is fine.

## 2024-05-29 DIAGNOSIS — G43.909 MIGRAINE WITHOUT STATUS MIGRAINOSUS, NOT INTRACTABLE, UNSPECIFIED MIGRAINE TYPE: ICD-10-CM

## 2024-05-29 RX ORDER — ERENUMAB-AOOE 140 MG/ML
INJECTION, SOLUTION SUBCUTANEOUS
Qty: 1 ML | Refills: 11 | Status: SHIPPED | OUTPATIENT
Start: 2024-05-29

## 2024-07-24 ENCOUNTER — OFFICE VISIT (OUTPATIENT)
Dept: NEUROLOGY | Facility: CLINIC | Age: 49
End: 2024-07-24
Payer: COMMERCIAL

## 2024-07-24 VITALS
HEART RATE: 72 BPM | DIASTOLIC BLOOD PRESSURE: 76 MMHG | BODY MASS INDEX: 19.88 KG/M2 | WEIGHT: 108 LBS | HEIGHT: 62 IN | SYSTOLIC BLOOD PRESSURE: 110 MMHG

## 2024-07-24 DIAGNOSIS — G43.909 MIGRAINE WITHOUT STATUS MIGRAINOSUS, NOT INTRACTABLE, UNSPECIFIED MIGRAINE TYPE: ICD-10-CM

## 2024-07-24 DIAGNOSIS — G43.009 MIGRAINE WITHOUT AURA AND WITHOUT STATUS MIGRAINOSUS, NOT INTRACTABLE: ICD-10-CM

## 2024-07-24 DIAGNOSIS — R29.898 LEFT HAND WEAKNESS: ICD-10-CM

## 2024-07-24 DIAGNOSIS — G40.209 NONINTRACTABLE EPILEPSY WITH COMPLEX PARTIAL SEIZURES: Primary | ICD-10-CM

## 2024-07-24 PROCEDURE — 3078F DIAST BP <80 MM HG: CPT | Mod: CPTII,S$GLB,, | Performed by: NURSE PRACTITIONER

## 2024-07-24 PROCEDURE — 1160F RVW MEDS BY RX/DR IN RCRD: CPT | Mod: CPTII,S$GLB,, | Performed by: NURSE PRACTITIONER

## 2024-07-24 PROCEDURE — 1159F MED LIST DOCD IN RCRD: CPT | Mod: CPTII,S$GLB,, | Performed by: NURSE PRACTITIONER

## 2024-07-24 PROCEDURE — 3008F BODY MASS INDEX DOCD: CPT | Mod: CPTII,S$GLB,, | Performed by: NURSE PRACTITIONER

## 2024-07-24 PROCEDURE — 99214 OFFICE O/P EST MOD 30 MIN: CPT | Mod: S$GLB,,, | Performed by: NURSE PRACTITIONER

## 2024-07-24 PROCEDURE — 99999 PR PBB SHADOW E&M-EST. PATIENT-LVL III: CPT | Mod: PBBFAC,,, | Performed by: NURSE PRACTITIONER

## 2024-07-24 PROCEDURE — 3074F SYST BP LT 130 MM HG: CPT | Mod: CPTII,S$GLB,, | Performed by: NURSE PRACTITIONER

## 2024-07-24 RX ORDER — ERENUMAB-AOOE 140 MG/ML
INJECTION, SOLUTION SUBCUTANEOUS
Qty: 1 ML | Refills: 11 | Status: SHIPPED | OUTPATIENT
Start: 2024-07-24

## 2024-07-24 RX ORDER — UBROGEPANT 100 MG/1
100 TABLET ORAL DAILY PRN
Qty: 16 TABLET | Refills: 2 | Status: SHIPPED | OUTPATIENT
Start: 2024-07-24

## 2024-07-24 RX ORDER — LEVOTHYROXINE SODIUM 75 UG/1
75 TABLET ORAL
COMMUNITY

## 2024-07-24 NOTE — ASSESSMENT & PLAN NOTE
-suspect episodes of muscle contraction are occurring when medication is taken late.  She will keep track of this going forward for confirmation.  Will get EEG just for reassurance purposes

## 2024-07-24 NOTE — PROGRESS NOTES
Subjective:       Patient ID: Karyn Clemente is a 49 y.o. female.    Chief Complaint: Migraine (States did  not receive her Aimovig injection and ubrelvy this month. Not sure what happened. This month has had an increase in migraines. Migraines are almost everyday to frontal area. Migraines will last for about a day. Pain is sharp/dull ache. Does have N/V with migraines and sees bright lights in eyes. ), Nonintractable epilepsy with complex partial seizures (Denies any seizure activity. This morning did have 2 episodes of spasms to upper body from waist on up to head lasting 3- 40 seconds. ), Pulsatile tinnitus  (States for a while she was not having any ringing in her ears, but last week she just remembered she did tell her  she was having some ringing in her left ear. ), and Left hand weakness (Did have EMG/NCS with Dr. Oliva. He said her nerves in left hand were good. She may have osteoarthritis. )      History of Present Illness:  Follow-up Visit for migraines and seizures.  Migraines have been frequent over the last month due to issues with her pharmacy and receiving her Aimovig and Ubrelvy.  She has had consistent issues with her medications through [facility].  Prior to being out of her medication, migraines were very well controlled.  She would only get 1 or 2 migraines a week that were far less severe and absolutely responsive to Ubrelvy.      She has not had any seizures since last visit, but is wanting to ask about some instances of muscle contraction that have been happening about once a week.  She says it feels similar to labor contractions just all over her body.  It is not necessarily painful, just her muscles tightening.  It starts in her legs and goes all the way up to her head and her whole body just feels like it squeezing for a few seconds and then the muscles release.  This happens for about 30 minutes.  She does note that on a couple of occasions, the episodes happen the morning after  she was several hours late taking her medication.  She has no impairment in consciousness during these events.  She is still taking Keppra XR 2500 mg daily with no side effect.    She continues to have left hand pain and weakness.  She did have an EMG after last visit which was normal.  She continues to have pain that is localized to the median distribution in the left hand especially in the palmar region radiating into her thumb.  She describes the pain as a nerve type pain.  She has difficulty with opening things and with holding items.  The pain is worse at night when she sleeps              Past Medical History:   Diagnosis Date    Bilateral leg pain     Fibromyalgia     Headache     Lower back pain     Seizures        Past Surgical History:   Procedure Laterality Date    BREAST BIOPSY      CHOLECYSTECTOMY      COLONOSCOPY      HYSTERECTOMY      THYROIDECTOMY          Family History   Problem Relation Name Age of Onset    Bipolar disorder Mother      Seizures Mother      Hepatitis Mother      Hepatitis Father          Social History     Socioeconomic History    Marital status:    Tobacco Use    Smoking status: Former     Types: Cigarettes    Smokeless tobacco: Never   Substance and Sexual Activity    Alcohol use: Not Currently    Drug use: Yes     Types: Oxycodone        Outpatient Encounter Medications as of 7/24/2024   Medication Sig Dispense Refill    amphetamine (ADZENYS XR-ODT) 9.4 mg TbLB Take 9.4 mg by mouth once daily.      calcitRIOL (ROCALTROL) 0.25 MCG Cap Take 0.25 mcg by mouth once daily.      cloNIDine (CATAPRES) 0.1 MG tablet Take 0.1 mg by mouth 2 (two) times daily.      erenumab-aooe (AIMOVIG AUTOINJECTOR) 140 mg/mL autoinjector INJECT 1 ML IN THE MUSCLE FOR 30 DAYS 1 mL 11    levetiracetam XR (KEPPRA XR) 500 mg Tb24 24 hr tablet TAKE FIVE TABLETS BY MOUTH EVERY  tablet 3    levothyroxine (SYNTHROID) 75 MCG tablet Take 75 mcg by mouth before breakfast.      oxyCODONE-acetaminophen  "(PERCOCET)  mg per tablet Take 1 tablet by mouth 4 (four) times daily as needed.      sumatriptan (IMITREX) 50 MG tablet TAKE 1 TABLET ONCE DAILY AS NEEDED FOR MIGRAINE--MAY REPEAT DOSE ONCE IN 2 HOURS 9 tablet 2    traZODone (DESYREL) 50 MG tablet Take 100 mg by mouth nightly as needed.      UBRELVY 100 mg tablet Take 1 tablet (100 mg total) by mouth daily as needed for Migraine. 16 tablet 2    propranoloL (INDERAL) 20 MG tablet Take 40 mg by mouth 2 (two) times daily.      [DISCONTINUED] dextroamphetamine-amphetamine (ADDERALL) 12.5 MG tablet Take 12.5 mg by mouth once daily.      [DISCONTINUED] INTRAROSA 6.5 mg Inst Place 1 application vaginally as needed.      [DISCONTINUED] levothyroxine (SYNTHROID) 100 MCG tablet Take 100 mcg by mouth once daily. (Patient not taking: Reported on 7/24/2024)      [DISCONTINUED] levothyroxine (SYNTHROID) 112 MCG tablet Take 112 mcg by mouth once daily. (Patient not taking: Reported on 7/24/2024)       No facility-administered encounter medications on file as of 7/24/2024.      Objective:   /76 (BP Location: Left arm)   Pulse 72   Ht 5' 2" (1.575 m)   Wt 49 kg (108 lb)   BMI 19.75 kg/m²        Physical Exam  NAD  alert and oriented  cognition and perception intact  no aphasia  EOMI  no facial asymmetry  no dysarthria  moves all extremities symmetrically  FDI and APB weakness L>R  no gross coordination abnormalities  gait normal       Assessment & Plan:      1. Nonintractable epilepsy with complex partial seizures  Overview:  This is a patient who was established with Dr. Monahan/Shagufta in 2016.  Her care was transferred to Dr. Harding in October 2017. Her typical seizures historically consisted of a headache or sharp pain on the left side, photophobia, and phonophobia.  She could hear things but cannot respond and she would have lapses in time.  In the past, she has been on Zonegran, topiramate (memory issues), IR Keppra, XR Keppra.  She is now maintained on keppra " XR 2500 mg daily    Assessment & Plan:  -suspect episodes of muscle contraction are occurring when medication is taken late.  She will keep track of this going forward for confirmation.  Will get EEG just for reassurance purposes      2. Migraine without aura and without status migrainosus, not intractable  Overview:  She has been on several AEDs for seizures.  She has also been on Lyrica, cymbalta, propranolol, trazadone, and topiramate in the past.  She was started on Aimovig in 2018 and migraines have been well controlled ever since.     Prior abortive include PO and SQ Imitrex, cambia, migraine cocktail, sprix    Assessment & Plan:  -continue aimovig and ubrelvy.  Send to [facility] given her issues with pharamcy      3. Left hand weakness  Overview:  MRI C-spine showed mild multilevel cervical degenerative changes with no significant spinal canal stenosis and with moderate bilateral neuroforaminal stenosis at C5-6    Assessment & Plan:  -EMG was normal, but symptoms are still consistent with CTS.  Send ortho referral for eval            This note was created with the assistance of voice recognition software. There may be transcription errors as a result of using this technology however minimal. Effort has been made to assure accuracy of transcription but any obvious errors or omissions should be clarified with the author of the document.